# Patient Record
Sex: FEMALE | Race: WHITE | NOT HISPANIC OR LATINO | Employment: PART TIME | URBAN - METROPOLITAN AREA
[De-identification: names, ages, dates, MRNs, and addresses within clinical notes are randomized per-mention and may not be internally consistent; named-entity substitution may affect disease eponyms.]

---

## 2020-10-23 PROBLEM — K74.60 CIRRHOSIS, NON-ALCOHOLIC: Status: ACTIVE | Noted: 2020-10-23

## 2020-10-23 PROBLEM — Z87.19 H/O: UPPER GI BLEED: Status: ACTIVE | Noted: 2020-10-23

## 2020-10-23 PROBLEM — D25.0 SUBMUCOUS LEIOMYOMA OF UTERUS: Status: ACTIVE | Noted: 2020-10-23

## 2020-11-13 ENCOUNTER — TELEPHONE (OUTPATIENT)
Dept: TRANSPLANT | Facility: CLINIC | Age: 60
End: 2020-11-13

## 2020-11-13 NOTE — TELEPHONE ENCOUNTER
Pt called re referral for liver transplant services.  Pt currently per [facility] without insurance. lvm to call me back regarding referral and insurance. Letter mailed requesting a call back.

## 2020-11-13 NOTE — LETTER
November 13, 2020    Tiffani Lentz  [address] [place] [id number]      Dear Tiffani Lentz:    Your doctor has referred you to the [facility].Please call us at [phone] to complete the referral process.      We look forward to seeing you soon.     If you received a call and have been scheduled, please disregard this letter.       Sincerely,    Chikis Cabello RN, [facility]  Liver Transplant Coordinator    [facility]   [address] [place] [address] [id number] [phone]

## 2020-11-17 ENCOUNTER — TELEPHONE (OUTPATIENT)
Dept: TRANSPLANT | Facility: CLINIC | Age: 60
End: 2020-11-17

## 2020-11-17 NOTE — TELEPHONE ENCOUNTER
Returned call [facility] re insurance needed,.  Requested call back.  Referral from [facility]. [facility]  [address] [facility] [address]  Phone: [phone]  Fax: [phone]  Direct line for Afsaneh Lange NP, called no answer, message via Epic messaging re pt insurance.

## 2020-11-17 NOTE — TELEPHONE ENCOUNTER
----- Message from Derrell Lassiter sent at 11/17/2020 10:45 AM CST -----          Pt returning missed call from Fri. 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                  [phone] (M)

## 2020-11-18 ENCOUNTER — TELEPHONE (OUTPATIENT)
Dept: TRANSPLANT | Facility: CLINIC | Age: 60
End: 2020-11-18

## 2020-11-18 NOTE — TELEPHONE ENCOUNTER
----- Message from Domo Shaw sent at 11/18/2020  9:28 AM CST -----    Returned call to pt, but there was no answer. LV for pt to call back.   .  ----- Message -----  From: Mallory Peña  Sent: 11/18/2020   8:45 AM CST  To: [facility] Liver Referral Pool    Pt is returning a call to Chikis      Contact [phone]

## 2020-11-18 NOTE — TELEPHONE ENCOUNTER
----- Message from Alvina Rich sent at 11/18/2020  4:34 PM CST -----  Contact: pt  Returning a call.

## 2020-11-18 NOTE — TELEPHONE ENCOUNTER
Patient called and informed that copy of her insurance information was needed to proceed with appointments.  Patient reports that she currently does not have any insurance and will be trying to get some.   Patient will call once coverage has been obtained.

## 2020-11-20 ENCOUNTER — SPECIALTY PHARMACY (OUTPATIENT)
Dept: PHARMACY | Facility: CLINIC | Age: 60
End: 2020-11-20

## 2020-11-20 DIAGNOSIS — K74.60 CIRRHOSIS, NON-ALCOHOLIC: ICD-10-CM

## 2020-11-20 DIAGNOSIS — B19.20 HEPATITIS C VIRUS INFECTION WITHOUT HEPATIC COMA, UNSPECIFIED CHRONICITY: ICD-10-CM

## 2021-01-06 ENCOUNTER — SPECIALTY PHARMACY (OUTPATIENT)
Dept: PHARMACY | Facility: CLINIC | Age: 61
End: 2021-01-06

## 2021-01-25 ENCOUNTER — SPECIALTY PHARMACY (OUTPATIENT)
Dept: PHARMACY | Facility: CLINIC | Age: 61
End: 2021-01-25

## 2021-02-04 ENCOUNTER — TELEPHONE (OUTPATIENT)
Dept: PHARMACY | Facility: CLINIC | Age: 61
End: 2021-02-04

## 2021-02-16 PROBLEM — Z72.0 TOBACCO USE: Status: ACTIVE | Noted: 2021-02-16

## 2021-02-19 ENCOUNTER — SPECIALTY PHARMACY (OUTPATIENT)
Dept: PHARMACY | Facility: CLINIC | Age: 61
End: 2021-02-19

## 2021-02-24 PROBLEM — R87.615 PAP SMEAR OF CERVIX UNSATISFACTORY: Status: ACTIVE | Noted: 2021-02-24

## 2021-03-13 ENCOUNTER — SPECIALTY PHARMACY (OUTPATIENT)
Dept: PHARMACY | Facility: CLINIC | Age: 61
End: 2021-03-13

## 2021-03-17 ENCOUNTER — SPECIALTY PHARMACY (OUTPATIENT)
Dept: PHARMACY | Facility: CLINIC | Age: 61
End: 2021-03-17

## 2021-04-01 PROBLEM — D69.6 THROMBOCYTOPENIA: Status: ACTIVE | Noted: 2021-04-01

## 2021-04-01 PROBLEM — R16.1 SPLENOMEGALY: Status: ACTIVE | Noted: 2021-04-01

## 2021-05-14 ENCOUNTER — SPECIALTY PHARMACY (OUTPATIENT)
Dept: PHARMACY | Facility: CLINIC | Age: 61
End: 2021-05-14

## 2021-08-19 ENCOUNTER — SPECIALTY PHARMACY (OUTPATIENT)
Dept: PHARMACY | Facility: CLINIC | Age: 61
End: 2021-08-19

## 2021-11-03 PROBLEM — Z98.890 STATUS POST COLPOSCOPY: Status: ACTIVE | Noted: 2021-11-03

## 2022-03-17 ENCOUNTER — SPECIALTY PHARMACY (OUTPATIENT)
Dept: PHARMACY | Facility: CLINIC | Age: 62
End: 2022-03-17

## 2022-03-21 NOTE — TELEPHONE ENCOUNTER
PA Case ID: [id number]   Approved from 3/18/2022 - 3/18/2023    CHRISTIAN required and approved:  Case ID: [id number]  Approved from 3/21/2022 - 3/21/2023    Estimated copay is high: $7849.04  Will forward to FA team for assistance.

## 2022-03-22 NOTE — TELEPHONE ENCOUNTER
Outgoing call to patient. Pt informed we received script for Doptlet. Medication was approved through insurance and the copay was high. Pt is eligible for copay card. Pt provided consent for [facility] to obtain. Doptelet Copay card secured to assist with high copay.     FOR DOCUMENTATION ONLY:  Financial Assistance for Doptelet approved from 03/22/2022 to 12/31/2022  [employer]  BIN: [id number]  PCN: [id number]  Id: [id number]  GRP: [id number]  Assistance amount: $15,000

## 2022-03-24 ENCOUNTER — SPECIALTY PHARMACY (OUTPATIENT)
Dept: PHARMACY | Facility: CLINIC | Age: 62
End: 2022-03-24

## 2022-03-24 NOTE — TELEPHONE ENCOUNTER
Specialty Pharmacy - Initial Clinical Assessment    Specialty Medication Orders Linked to Encounter    Flowsheet Row Most Recent Value   Medication #1 avatrombopag (DOPTELET, 15 TAB PACK,) 20 mg Tab (Order#[id number], Rx#[id number])        Patient Diagnosis   D69.6 - Thrombocytopenia    Subjective    Tiffani Lentz is a 61 y.o. female, who is followed by the specialty pharmacy service for management and education.    Recent Encounters     Date Type Provider Description    03/24/2022 Specialty Pharmacy Tiffanie Yu, [place] Initial Clinical Assessment    03/17/2022 Specialty Pharmacy Cherelle Shaw PharmD Referral Authorization    08/19/2021 Specialty Pharmacy Francia Rogers Refill Coordination    05/17/2021 Specialty Pharmacy Tiffanie Yu PharmD Initial Clinical Assessment    05/14/2021 Specialty Pharmacy Tiffanie Yu, [place] Referral Authorization        Clinical call attempts since last clinical assessment   No call attempts found.     Current Outpatient Medications   Medication Sig    avatrombopag (DOPTELET, 15 TAB PACK,) 20 mg Tab Take 60 mg by mouth once daily. for 5 days    docusate sodium (COLACE) 50 MG capsule Take 50 mg by mouth once daily at 6am.   Last reviewed on 3/23/2022  1:19 PM by Anibal Kaufman MD    Review of patient's allergies indicates:   Allergen Reactions    Demerol [meperidine]     Egg derived     Fish containing products     Shrimp    Last reviewed on  3/24/2022 1:45 PM by Tiffanie Yu    Drug Interactions    Drug interactions evaluated: yes  Clinically relevant drug interactions identified: no  Provided the patient with educational material regarding drug interactions: not applicable         Adverse Effects    *All other systems reviewed and are negative       Assessment Questions - Documented Responses    Flowsheet Row Most Recent Value   Assessment    Medication Reconciliation completed for patient Yes   During the past 4 weeks, has patient missed any  "activities due to condition or medication? No   During the past 4 weeks, did patient have any of the following urgent care visits? None   Goals of Therapy Status Achieving   Status of the patients ability to self-administer: Is Able   All education points have been covered with patient? No, patient declined- printed education provided   Welcome packet contents reviewed and discussed with patient? No   Assesment completed? No  [She declined full initial consultation]   Plan Therapy continued   Do you need to open a clinical intervention (i-vent)? No   Do you want to schedule first shipment? Yes        Refill Questions - Documented Responses    Flowsheet Row Most Recent Value   Patient Availability and HIPAA Verification    Does patient want to proceed with activity? No   HIPAA/medical authority confirmed? Yes   Relationship to patient of person spoken to? Self   Refill Screening Questions    When does the patient need to receive the medication? 03/29/22   Refill Delivery Questions    How will the patient receive the medication? Mail   When does the patient need to receive the medication? 03/29/22   Shipping Address Home   Address in [facility] confirmed and updated if neccessary? Yes   Expected Copay ($) 0   Is the patient able to afford the medication copay? Yes   Payment Method zero copay   Days supply of Refill 5   Supplies needed? No supplies needed   Refill activity completed? Yes   Refill activity plan Refill scheduled   Shipment/Pickup Date: 03/28/22          Objective    She has a past medical history of Cirrhosis and Hepatitis.    Treatment history: N/A    BP Readings from Last 4 Encounters:   03/23/22 111/64   03/10/22 (!) 124/58   02/17/22 (!) 148/78   02/15/22 (!) 147/66     Ht Readings from Last 4 Encounters:   03/23/22 5' 9" (1.753 m)   03/10/22 5' 9" (1.753 m)   02/17/22 5' 9" (1.753 m)   02/15/22 5' 9" (1.753 m)     Wt Readings from Last 4 Encounters:   03/23/22 85.8 kg (189 lb 3.2 oz) "   03/10/22 85.5 kg (188 lb 8 oz)   02/17/22 86.8 kg (191 lb 6.4 oz)   02/15/22 85.2 kg (187 lb 12.8 oz)     Recent Labs   Lab Result Units 03/10/22  1153 02/17/22  1107 01/12/22  1333   Creatinine mg/dL 0.78  --  0.68   ALT U/L 35  --  33   AST U/L 27  --  26   Hemoglobin g/dL 13.7 12.4 12.2     The goals of prescribed drug therapy management include:  · Supporting patient to meet the prescriber's medical treatment objectives  · Improving or maintaining quality of life  · Maintaining optimal therapy adherence  · Minimizing and managing side effects      Goals of Therapy Status: Achieving    Assessment/Plan  Patient plans to continue therapy without changes      Indication, dosage, appropriateness, effectiveness, safety and convenience of her specialty medication(s) were reviewed today.     Patient Education   Pharmacist offer to  patient was declined. Printed educational materials will be provided with medication.  Patient did accept verbal education on the following topics:  · Proper use, timely administration, and missed dose management  · Storage, safe handling, and disposal    Called Mrs. Lentz for initial consultation--of note this is the same dose but last refill was in 8/2021. Counseled that avatrombopag should be taken by taking three tablets (60 mg) by mouth once daily for 5 consecutive days with food. In the case of a missed dose, Mrs. Lentz should take the next dose of avatrombopag as soon as they remember. Counseled that she should not take two doses at one time to make up for a missed dose, and she should take the next dose at the usual time the next day; all 5 days of dosing should be completed. Counseled that she should initiate avatrombopag 10 to 13 days prior to the scheduled procedure. Counseled she should undergo procedure 5 to 8 days after the last avatrombopag dose. Informed her that her physician should obtain a platelet count prior to therapy administration and on the day of  the procedure (to ensure adequate increase in platelet count). Counseled her to store at room temperature.     Tasks added this encounter   No tasks added.   Tasks due within next 3 months   3/22/2022 - Clinical - Initial Assessment     Tiffanie Yu, PharmD  [facility]  [address]  [facility] [address]  Phone: [phone]  Fax: [phone]

## 2022-03-24 NOTE — TELEPHONE ENCOUNTER
Dose prescribed is 60 mg po daily X 15 days. Given most recent platelets are 58 K/uL, recommendation is 40 mg po daily X 5 days.     Called Dr. Hagan's office at [phone]. Representative recommended I SecureChat him.     meetst message sent to Dr. Hagan on 3/24.     Will perform initial once this has been clarified. Called Mrs. Lentz and informed her all of this. She denied any questions or concerns.

## 2022-04-18 ENCOUNTER — PATIENT MESSAGE (OUTPATIENT)
Dept: ADMINISTRATIVE | Facility: OTHER | Age: 62
End: 2022-04-18

## 2022-08-19 ENCOUNTER — SPECIALTY PHARMACY (OUTPATIENT)
Dept: PHARMACY | Facility: CLINIC | Age: 62
End: 2022-08-19

## 2022-08-30 ENCOUNTER — SPECIALTY PHARMACY (OUTPATIENT)
Dept: PHARMACY | Facility: CLINIC | Age: 62
End: 2022-08-30

## 2022-08-30 ENCOUNTER — PATIENT MESSAGE (OUTPATIENT)
Dept: PHARMACY | Facility: CLINIC | Age: 62
End: 2022-08-30

## 2022-08-30 NOTE — TELEPHONE ENCOUNTER
New RX received for Doptelet. PA not needed. Estimated copay with Copay card on file $0. BI/FA not needed since pt has received before. Forwarding for initial consult.

## 2022-08-30 NOTE — TELEPHONE ENCOUNTER
Specialty Pharmacy - Initial Clinical Assessment    Specialty Medication Orders Linked to Encounter      Flowsheet Row Most Recent Value   Medication #1 avatrombopag (DOPTELET, 10 TAB PACK,) 20 mg Tab (Order#[id number], Rx#[id number])          Patient Diagnosis   D69.6 - Thrombocytopenia    Subjective    Tiffani Lentz is a 61 y.o. female, who is followed by the specialty pharmacy service for management and education.    Recent Encounters       Date Type Provider Description    08/30/2022 Specialty Pharmacy Vijaya Campa PharmD Initial Clinical Assessment    08/30/2022 Specialty Pharmacy Vijaya Campa PharmD Referral Authorization    08/19/2022 Specialty Pharmacy Francia Rogers Clinical Intervention    03/24/2022 Specialty Pharmacy Tiffanie Yu PharmD Initial Clinical Assessment    03/17/2022 Specialty Pharmacy Cherelle Shaw PharmD Referral Authorization          Clinical call attempts since last clinical assessment   8/30/2022  6:54 PM - Specialty Pharmacy - Clinical Assessment by Vijaya Campa PharmD  8/30/2022  6:55 PM - Specialty Pharmacy - Clinical Assessment by Vijaya Campa PharmD     Current Outpatient Medications   Medication Sig    avatrombopag (DOPTELET, 10 TAB PACK,) 20 mg Tab Take 2 tablets (40 mg) by mouth once daily. for 5 days    carvediloL (COREG) 3.125 MG tablet Take 1 tablet (3.125 mg total) by mouth 2 (two) times daily with meals.    docusate sodium (COLACE) 50 MG capsule Take 1 capsule (50 mg total) by mouth once daily at 6am.   Last reviewed on 8/24/2022  6:25 PM by Felicita Clifford MD    Review of patient's allergies indicates:   Allergen Reactions    Demerol [meperidine]     Egg derived     Fish containing products     Shrimp    Last reviewed on  8/29/2022 4:42 PM by Felicita Clifford    Drug Interactions    Clinically relevant drug interactions identified: no           Assessment Questions - Documented Responses      Flowsheet Row Most Recent Value   Assessment    Medication  Reconciliation completed for patient Yes   During the past 4 weeks, has patient missed any activities due to condition or medication? No   During the past 4 weeks, did patient have any of the following urgent care visits? None   Goals of Therapy Status Discussed (new start)   Status of the patients ability to self-administer: Is Able   All education points have been covered with patient? No, patient declined- printed education provided  [patient has taken before]   Welcome packet contents reviewed and discussed with patient? No   Assesment completed? Yes   Plan Therapy being initiated   Do you need to open a clinical intervention (i-vent)? No   Do you want to schedule first shipment? Yes   Medication #1 Assessment Info    Patient status Existing medication, Exisiting to [facility]   Is this medication appropriate for the patient? Yes   Is this medication effective? Not yet started          Refill Questions - Documented Responses      Flowsheet Row Most Recent Value   Patient Availability and HIPAA Verification    Does patient want to proceed with activity? Yes   HIPAA/medical authority confirmed? Yes   Relationship to patient of person spoken to? Self   Refill Screening Questions    When does the patient need to receive the medication? 09/01/22   Refill Delivery Questions    How will the patient receive the medication? Mail   When does the patient need to receive the medication? 09/01/22   Shipping Address Home   Address in [facility] confirmed and updated if neccessary? Yes   Expected Copay ($) 0   Is the patient able to afford the medication copay? Yes   Payment Method zero copay   Days supply of Refill 5   Supplies needed? No supplies needed   Refill activity completed? Yes   Refill activity plan Refill scheduled   Shipment/Pickup Date: 08/31/22            Objective    She has a past medical history of Cirrhosis, Hepatitis, and Thrombocytopenia, unspecified.    Tried/failed medications: none     BP Readings from  "Last 4 Encounters:   08/22/22 120/62   08/11/22 117/63   06/21/22 (!) 142/89   05/20/22 (!) 119/58     Ht Readings from Last 4 Encounters:   08/22/22 5' 9" (1.753 m)   08/11/22 5' 9" (1.753 m)   05/20/22 5' 9" (1.753 m)   03/23/22 5' 9" (1.753 m)     Wt Readings from Last 4 Encounters:   08/22/22 85.4 kg (188 lb 3.2 oz)   08/11/22 82.8 kg (182 lb 9.6 oz)   05/20/22 83.9 kg (185 lb)   03/23/22 85.8 kg (189 lb 3.2 oz)     Recent Labs   Lab Result Units 08/22/22  1427   Creatinine mg/dL 0.72   ALT U/L 29   AST U/L 23   Hemoglobin g/dL 13.1     The goals of prescribed drug therapy management include:  Supporting patient to meet the prescriber's medical treatment objectives  Improving or maintaining quality of life  Maintaining optimal therapy adherence  Minimizing and managing side effects      Goals of Therapy Status: Discussed (new start)    Assessment/Plan  Patient plans to start therapy on 09/01/22      Indication, dosage, appropriateness, effectiveness, safety and convenience of her specialty medication(s) were reviewed today.     Patient Education   Pharmacist offer to  patient was declined. Printed educational materials will be provided with medication.  Patient did accept verbal education on the following topics:  · Storage, safe handling, and disposal    Patient has taken Doptelet for a procedure in the past. She stated she did not have questions or concerns. Only needs to prevent bleeding in upcoming procedure.     Tasks added this encounter   9/2/2022 - Refill Call (Auto Added)  5/30/2023 - Clinical - Follow Up Assesement (Annual)   Tasks due within next 3 months   No tasks due.     Vijaya Campa, PharmD  [facility]  [address] [place] [address]  Phone: [phone]  Fax: [phone]  "

## 2023-03-06 ENCOUNTER — SPECIALTY PHARMACY (OUTPATIENT)
Dept: PHARMACY | Facility: CLINIC | Age: 63
End: 2023-03-06

## 2023-03-06 NOTE — TELEPHONE ENCOUNTER
Incoming call from patient for Doptelet refill. Rejecting as product not on formulary. Informed patient [facility] will call her back once issues are resolved. Routing to assigned [facility].

## 2023-03-06 NOTE — TELEPHONE ENCOUNTER
Specialty Pharmacy - Refill Coordination    Specialty Medication Orders Linked to Encounter      Flowsheet Row Most Recent Value   Medication #1 avatrombopag (DOPTELET, 15 TAB PACK,) 20 mg Tab (Order#[id number], Rx#[id number])            Refill Questions - Documented Responses      Flowsheet Row Most Recent Value   Patient Availability and HIPAA Verification    Does patient want to proceed with activity? Yes   HIPAA/medical authority confirmed? Yes   Relationship to patient of person spoken to? Self   Refill Screening Questions    Would patient like to speak to a pharmacist? No   When does the patient need to receive the medication? 03/14/23   Refill Delivery Questions    How will the patient receive the medication? Mail   When does the patient need to receive the medication? 03/14/23   Shipping Address Home   Address in [facility] confirmed and updated if neccessary? Yes   Expected Copay ($) 0   Is the patient able to afford the medication copay? Yes   Payment Method zero copay   Days supply of Refill 5   Supplies needed? No supplies needed   Refill activity completed? Yes   Refill activity plan Refill scheduled   Shipment/Pickup Date: 03/08/23            Current Outpatient Medications   Medication Sig    avatrombopag (DOPTELET, 10 TAB PACK,) 20 mg Tab Take 40 mg by mouth once daily. Start 10 days prior to EGD procedure,have lab draw AM prior to or of procedure for 5 days    avatrombopag (DOPTELET, 15 TAB PACK,) 20 mg Tab Take 3 tablets (60 mg) by mouth once daily. for 5 days    carvediloL (COREG) 3.125 MG tablet Take 1 tablet (3.125 mg total) by mouth 2 (two) times daily with meals.    docusate sodium (COLACE) 50 MG capsule Take 1 capsule (50 mg total) by mouth 2 (two) times daily.   Last reviewed on 2/27/2023  1:37 PM by Harleen Perez MA    Review of patient's allergies indicates:   Allergen Reactions    Demerol [meperidine]     Egg derived     Fish containing products     Shrimp     Last  reviewed on  3/6/2023 7:19 AM by Felicita Clifford      Tasks added this encounter   No tasks added.   Tasks due within next 3 months   No tasks due.     Vijaya Stanton, PharmD  [facility]  [address]  [facility] [address]  Phone: [phone]  Fax: [phone]

## 2023-03-06 NOTE — TELEPHONE ENCOUNTER
Outgoing call to plan. Spoke with rep Becky. New PA was needed. Approved. Case ID#[id number]. Approved 3/6/23-3/5/24. Estimated copay now $0 with copay card on file.

## 2023-06-14 ENCOUNTER — SPECIALTY PHARMACY (OUTPATIENT)
Dept: PHARMACY | Facility: CLINIC | Age: 63
End: 2023-06-14

## 2023-06-14 NOTE — TELEPHONE ENCOUNTER
Specialty Pharmacy - Refill Coordination    Specialty Medication Orders Linked to Encounter      Flowsheet Row Most Recent Value   Medication #1 avatrombopag (DOPTELET, 15 TAB PACK,) 20 mg Tab (Order#[id number], Rx#[id number])            Refill Questions - Documented Responses      Flowsheet Row Most Recent Value   Patient Availability and HIPAA Verification    Does patient want to proceed with activity? Yes   HIPAA/medical authority confirmed? Yes   Relationship to patient of person spoken to? Self   Refill Screening Questions    Would patient like to speak to a pharmacist? No   When does the patient need to receive the medication? 06/17/23   Refill Delivery Questions    How will the patient receive the medication? Mail   When does the patient need to receive the medication? 06/17/23   Shipping Address Home   Address in [facility] confirmed and updated if neccessary? Yes   Expected Copay ($) 0   Is the patient able to afford the medication copay? Yes   Payment Method zero copay   Days supply of Refill 5   Supplies needed? No supplies needed   Refill activity completed? Yes   Refill activity plan Refill scheduled   Shipment/Pickup Date: 06/14/23            Current Outpatient Medications   Medication Sig    avatrombopag (DOPTELET, 10 TAB PACK,) 20 mg Tab Take 2 tablets (40 mg) by mouth once daily.    docusate sodium (COLACE) 50 MG capsule Take 1 capsule (50 mg total) by mouth 2 (two) times daily.    propranoloL (INDERAL) 10 MG tablet Take 1 tablet (10 mg total) by mouth 2 (two) times daily.   Last reviewed on 5/19/2023  2:00 PM by Barbara Cerda RN    Review of patient's allergies indicates:   Allergen Reactions    Demerol [meperidine]     Egg derived     Fish containing products     Shrimp     Last reviewed on  6/14/2023 10:47 AM by Susana Waddell      Tasks added this encounter   No tasks added.   Tasks due within next 3 months   No tasks due.     Susana Waddell, PharmD  [facility]  [address] [facility] [address]  Phone: [phone]  Fax: [phone]

## 2025-05-09 PROBLEM — E80.6 HYPERBILIRUBINEMIA: Status: ACTIVE | Noted: 2025-05-09

## 2025-05-09 PROBLEM — Z95.828 S/P TIPS (TRANSJUGULAR INTRAHEPATIC PORTOSYSTEMIC SHUNT): Status: ACTIVE | Noted: 2021-11-03

## 2025-05-09 PROBLEM — E88.09 HYPOALBUMINEMIA: Status: ACTIVE | Noted: 2025-05-09

## 2025-05-13 ENCOUNTER — PATIENT OUTREACH (OUTPATIENT)
Dept: ADMINISTRATIVE | Facility: CLINIC | Age: 65
End: 2025-05-13

## 2025-05-13 NOTE — PROGRESS NOTES
C3 nurse attempted to contact Tiffani Lentz  for a TCC post hospital discharge follow up call. No answer. LVM requesting a callback at [phone].    The patient does not have a scheduled HOSFU appointment. Message sent to PCP's staff to assist with HOSFU appointment scheduling.

## 2025-08-18 RX ORDER — FUROSEMIDE 20 MG/1
20 TABLET ORAL DAILY
Qty: 30 TABLET | Refills: 11 | Status: SHIPPED | OUTPATIENT
Start: 2025-08-18 | End: 2026-08-18

## 2025-08-18 RX ORDER — SPIRONOLACTONE 50 MG/1
50 TABLET, FILM COATED ORAL DAILY
Qty: 30 TABLET | Refills: 11 | Status: SHIPPED | OUTPATIENT
Start: 2025-08-18 | End: 2026-08-18